# Patient Record
Sex: FEMALE | Race: WHITE | Employment: FULL TIME | URBAN - METROPOLITAN AREA
[De-identification: names, ages, dates, MRNs, and addresses within clinical notes are randomized per-mention and may not be internally consistent; named-entity substitution may affect disease eponyms.]

---

## 2018-01-18 ENCOUNTER — OFFICE VISIT (OUTPATIENT)
Dept: FAMILY MEDICINE CLINIC | Age: 63
End: 2018-01-18
Payer: COMMERCIAL

## 2018-01-18 VITALS
WEIGHT: 193.4 LBS | HEART RATE: 77 BPM | HEIGHT: 65 IN | BODY MASS INDEX: 32.22 KG/M2 | SYSTOLIC BLOOD PRESSURE: 125 MMHG | TEMPERATURE: 97.3 F | DIASTOLIC BLOOD PRESSURE: 72 MMHG | OXYGEN SATURATION: 98 %

## 2018-01-18 DIAGNOSIS — Z76.89 ESTABLISHING CARE WITH NEW DOCTOR, ENCOUNTER FOR: Primary | ICD-10-CM

## 2018-01-18 DIAGNOSIS — E03.9 HYPOTHYROIDISM, UNSPECIFIED TYPE: ICD-10-CM

## 2018-01-18 PROCEDURE — 99213 OFFICE O/P EST LOW 20 MIN: CPT | Performed by: NURSE PRACTITIONER

## 2018-01-18 RX ORDER — INSULIN GLARGINE 300 U/ML
INJECTION, SOLUTION SUBCUTANEOUS
COMMUNITY
Start: 2018-01-10

## 2018-01-18 RX ORDER — LOSARTAN POTASSIUM 50 MG/1
TABLET ORAL
COMMUNITY
Start: 2014-09-17

## 2018-01-18 RX ORDER — LEVOTHYROXINE SODIUM 112 UG/1
TABLET ORAL
COMMUNITY

## 2018-01-18 RX ORDER — PEN NEEDLE, DIABETIC 29 G X1/2"
NEEDLE, DISPOSABLE MISCELLANEOUS
Refills: 4 | COMMUNITY
Start: 2017-12-21 | End: 2018-09-20

## 2018-01-18 ASSESSMENT — ENCOUNTER SYMPTOMS
COUGH: 1
RHINORRHEA: 0
WHEEZING: 0
SINUS PRESSURE: 0
SINUS PAIN: 0
SHORTNESS OF BREATH: 0
ABDOMINAL PAIN: 0
NAUSEA: 0
VOMITING: 0
CHEST TIGHTNESS: 0

## 2018-01-18 ASSESSMENT — PATIENT HEALTH QUESTIONNAIRE - PHQ9
SUM OF ALL RESPONSES TO PHQ QUESTIONS 1-9: 0
SUM OF ALL RESPONSES TO PHQ9 QUESTIONS 1 & 2: 0
2. FEELING DOWN, DEPRESSED OR HOPELESS: 0
1. LITTLE INTEREST OR PLEASURE IN DOING THINGS: 0

## 2018-01-18 NOTE — PROGRESS NOTES
[facility] [place]  [address]  [place], [address]  [phone]      Valerie Marcano is a 63 y.o. female who presents today for her  medical conditions/complaints as noted below. Valerie Marcano is c/o of New Patient (est,ins change,sees endo for DM,declines flu vaccine,states labs were just done at endo,will call to get results,has appt with  next tue)  . HPI:   Pt here to est care. No concerns today. Insurance changed and needed new pcp. Had last labs done in dec 2017. She has new pt appt with dr Jonatan Nickerson next week, last a1c around 9. She has been diabetic for past 25 years. Stable on all meds. Past Medical History:   Diagnosis Date    Hypothyroidism     Kidney stones     Type II or unspecified type diabetes mellitus without mention of complication, not stated as uncontrolled       Past Surgical History:   Procedure Laterality Date    LITHOTRIPSY       Family History   Problem Relation Age of Onset    Heart Disease Mother     Colon Cancer Father     Diabetes Sister     Diabetes Brother      Social History   Substance Use Topics    Smoking status: Never Smoker    Smokeless tobacco: Never Used    Alcohol use No      Current Outpatient Prescriptions   Medication Sig Dispense Refill    levothyroxine (SYNTHROID) 112 MCG tablet       losartan (COZAAR) 50 MG tablet Take by mouth      TOUJEO SOLOSTAR 300 UNIT/ML injection pen       BD INSULIN SYRINGE ULTRAFINE 31G X 5/16\" 0.3 ML MISC USE 3 TIMES A DAY AS DIRECTED  4    aspirin 81 MG tablet Take 81 mg by mouth daily.  ONE TOUCH ULTRA TEST strip 1 each 3 times daily. 1    BD PEN NEEDLE SOLOMON U/F 32G X 4 MM MISC   1    Omega-3 Fatty Acids (FISH OIL) 1000 MG CAPS Take 3,000 mg by mouth 2 times daily.  insulin lispro (HUMALOG) 100 UNIT/ML injection vial Inject  into the skin 3 times daily (before meals).  Sliding scale      insulin lispro (HUMALOG) 100 UNIT/ML injection vial       tetracycline (ACHROMYCIN;SUMYCIN) 250 MG capsule Take 250 mg by mouth 3 times daily.  glyBURIDE-metformin (GLUCOVANCE) 5-500 MG per tablet Take 2 tablets by mouth 2 times daily (with meals).  insulin glargine (LANTUS SOLOSTAR) 100 UNIT/ML injection Inject 100 Units into the skin nightly.  levothyroxine (SYNTHROID) 112 MCG tablet Take 1 tablet by mouth daily. 0    losartan (COZAAR) 50 MG tablet Take 1 tablet by mouth daily. 1    simvastatin (ZOCOR) 20 MG tablet Take 1 tablet by mouth nightly. 0    LANTUS SOLOSTAR 100 UNIT/ML injection pen Inject 38 Units into the skin daily. 1    Vitamin D (CHOLECALCIFEROL) 1000 UNITS CAPS capsule Take 1,000 Units by mouth daily.  Omega-3 Fatty Acids (OMEGA-3 FISH OIL PO) Take  by mouth 2 times daily.  CINNAMON PO Take 1 tablet by mouth 2 times daily. No current facility-administered medications for this visit. No Known Allergies    Health Maintenance   Topic Date Due    Creatinine monitoring  1955    Hepatitis C screen  1955    HIV screen  11/18/1972    DTaP/Tdap/Td vaccine (1 - Tdap) 11/18/1976    Lipid screen  11/18/1997    Diabetes screen  11/18/1997    Potassium monitoring  10/15/2015    Zostavax vaccine  11/18/2017    Flu vaccine (1) 01/23/2019 (Originally 9/1/2017)    Breast cancer screen  09/12/2019    Cervical cancer screen  08/01/2020    Colon cancer screen colonoscopy  11/17/2027       Subjective:      Review of Systems   Constitutional: Negative for appetite change, chills, diaphoresis, fatigue and fever. HENT: Negative for postnasal drip, rhinorrhea, sinus pain and sinus pressure. Eyes: Negative for visual disturbance. Respiratory: Positive for cough. Negative for chest tightness, shortness of breath and wheezing. Cardiovascular: Negative for chest pain, palpitations and leg swelling. Gastrointestinal: Negative for abdominal pain, nausea and vomiting.    Endocrine: Negative for polydipsia, polyphagia and

## 2018-01-23 ENCOUNTER — TELEPHONE (OUTPATIENT)
Dept: PRIMARY CARE CLINIC | Age: 63
End: 2018-01-23

## 2018-01-23 NOTE — TELEPHONE ENCOUNTER
Patient needs a insurance referral sent in for Dr. David Aguillon. Patient has an appointment on Tuesday.

## 2018-02-13 LAB
CREATININE: 0.7 MG/DL
POTASSIUM (K+): 3.9

## 2018-07-23 ENCOUNTER — TELEPHONE (OUTPATIENT)
Dept: FAMILY MEDICINE CLINIC | Age: 63
End: 2018-07-23

## 2018-07-27 NOTE — TELEPHONE ENCOUNTER
Left a message for the patient to call back with the reasons for referral and the name of the nutritionist.

## 2018-07-27 NOTE — TELEPHONE ENCOUNTER
She sees the obgyn for her annual pap etc and the foot doctor for diabetic foot check  She is seeing dr Niels Bright for endo diabetic checks  Dr Niels Bright stated that she had to get referral from primary for nutrionist.

## 2018-07-30 NOTE — TELEPHONE ENCOUNTER
I can do her pap and diabetic foot exam at our office without referral, and again I need her to check on a covered provider for nutritionist since I do not know anyone in [place].

## 2018-08-07 ENCOUNTER — HOSPITAL ENCOUNTER (OUTPATIENT)
Age: 63
Setting detail: SPECIMEN
Discharge: HOME | End: 2018-08-07
Payer: COMMERCIAL

## 2018-08-07 LAB
THYROXINE, FREE: 1.4 NG/DL (ref 0.93–1.7)
TSH SERPL DL<=0.05 MIU/L-ACNC: 0.54 MIU/L (ref 0.3–5)

## 2018-09-20 ENCOUNTER — OFFICE VISIT (OUTPATIENT)
Dept: FAMILY MEDICINE CLINIC | Age: 63
End: 2018-09-20
Payer: COMMERCIAL

## 2018-09-20 ENCOUNTER — HOSPITAL ENCOUNTER (OUTPATIENT)
Age: 63
Setting detail: SPECIMEN
Discharge: HOME | End: 2018-09-20
Payer: COMMERCIAL

## 2018-09-20 VITALS
TEMPERATURE: 97.1 F | DIASTOLIC BLOOD PRESSURE: 75 MMHG | WEIGHT: 185 LBS | OXYGEN SATURATION: 98 % | BODY MASS INDEX: 30.82 KG/M2 | SYSTOLIC BLOOD PRESSURE: 138 MMHG | HEART RATE: 78 BPM | HEIGHT: 65 IN

## 2018-09-20 DIAGNOSIS — Z12.39 SCREENING FOR BREAST CANCER: ICD-10-CM

## 2018-09-20 DIAGNOSIS — Z11.59 ENCOUNTER FOR HEPATITIS C SCREENING TEST FOR LOW RISK PATIENT: ICD-10-CM

## 2018-09-20 DIAGNOSIS — Z23 NEED FOR TDAP VACCINATION: ICD-10-CM

## 2018-09-20 DIAGNOSIS — Z12.4 CERVICAL CANCER SCREENING: Primary | ICD-10-CM

## 2018-09-20 DIAGNOSIS — Z23 NEED FOR PROPHYLACTIC VACCINATION AND INOCULATION AGAINST VARICELLA: ICD-10-CM

## 2018-09-20 DIAGNOSIS — E78.2 MIXED HYPERLIPIDEMIA: ICD-10-CM

## 2018-09-20 PROCEDURE — 90471 IMMUNIZATION ADMIN: CPT | Performed by: NURSE PRACTITIONER

## 2018-09-20 PROCEDURE — 90715 TDAP VACCINE 7 YRS/> IM: CPT | Performed by: NURSE PRACTITIONER

## 2018-09-20 PROCEDURE — 99396 PREV VISIT EST AGE 40-64: CPT | Performed by: NURSE PRACTITIONER

## 2018-09-20 NOTE — PROGRESS NOTES
[address]  [address]  [phone]      Earnest Luong is a 63 y.o. female who presents today for her  medical conditions/complaints as noted below. Earnest Luong is c/o of Gynecologic Exam  .    HPI:     HPI  Pt here for routine pap. Has always had normal paps and same sexual partner ( ). She had 6 child vaginally. She is menopausal. She is taking calcium and vit d supplement. Past Medical History:   Diagnosis Date    Hypothyroidism     Hypothyroidism 1/18/2018    Kidney stones     Mixed hyperlipidemia 9/20/2018    Type II or unspecified type diabetes mellitus without mention of complication, not stated as uncontrolled       Past Surgical History:   Procedure Laterality Date    LITHOTRIPSY       Family History   Problem Relation Age of Onset    Heart Disease Mother     Colon Cancer Father     Diabetes Sister     Diabetes Brother      Social History   Substance Use Topics    Smoking status: Never Smoker    Smokeless tobacco: Never Used    Alcohol use No      Current Outpatient Prescriptions   Medication Sig Dispense Refill    zoster recombinant adjuvanted vaccine (SHINGRIX) 50 MCG SUSR injection Inject 0.5 mLs into the muscle once for 1 dose 50 MCG IM then repeat 2-6 months. 1 each 1    NOVOLOG 100 UNIT/ML injection vial 12 UNITS IN THE MORNING, 12 UNITS AT LUNCH, AND 20 UNITS AT SUPPER  3    insulin lispro (HUMALOG) 100 UNIT/ML injection vial       levothyroxine (SYNTHROID) 112 MCG tablet       losartan (COZAAR) 50 MG tablet Take by mouth      TOUJEO SOLOSTAR 300 UNIT/ML injection pen       glyBURIDE-metformin (GLUCOVANCE) 5-500 MG per tablet Take 2 tablets by mouth 2 times daily (with meals).  insulin glargine (LANTUS SOLOSTAR) 100 UNIT/ML injection Inject 100 Units into the skin nightly.  simvastatin (ZOCOR) 20 MG tablet Take 1 tablet by mouth nightly. 0    ONE TOUCH ULTRA TEST strip 1 each 3 times daily.   1 non-tender without mass, no thyromegaly or thyroid nodules, no cervical lymphadenopathy  Pulmonary/Chest: clear to auscultation bilaterally- no wheezes, rales or rhonchi, normal air movement, no respiratory distress  Cardiovascular: normal rate, regular rhythm, normal S1 and S2, no murmurs, rubs, clicks, or gallops  Abdomen: soft, non-tender, non-distended, normal bowel sounds, no masses or organomegaly  Pelvic: normal external genitalia, vulva, vagina, cervix, uterus and adnexa. No CMT. No noted discharge or lesions. No masses noted. Breast: appear normal, no suspicious masses, no skin or nipple changes or axillary nodes bilaterally. Psych: pleasant, cooperative      Diabetic foot check: Normal strength and range of motion of toes, feet, and ankles bilaterally. No joint deformity. No cyanosis or clubbing. 100% sensation at all 22 test points with the 10 gram filament. Dorsalis pedis pulses intact bilaterally. Capillary refill at the toes was less than 2 seconds. Light touch sensation intact bilaterally. Hair growth present on feet and toes bilaterally. No skin breakdown, erythema, rub spots, blisters, scaling, or ulcers. No calluses or corns. Toenails thin and not ingrown. No evidence of fungal infection. Assessment:    annual pap exam   Diagnosis Orders   1. Cervical cancer screening  PAP Smear   2. Need for prophylactic vaccination and inoculation against varicella  zoster recombinant adjuvanted vaccine (SHINGRIX) 50 MCG SUSR injection   3. Need for Tdap vaccination  Tdap (age 6y and older) IM ([address])   4. Uncontrolled type 2 diabetes mellitus without complication, with long-term current use of insulin (HCC)   DIABETES FOOT EXAM    Vitamin D 25 Hydroxy    Vitamin H56    Basic Metabolic Panel    CBC   5. Mixed hyperlipidemia  Lipid Panel   6. Encounter for hepatitis C screening test for low risk patient  Hepatitis C Antibody   7.  Screening for breast cancer  ISHAN DIGITAL SCREEN W CAD BILATERAL dose 50 MCG IM then repeat 2-6 months. Dispense:  1 each     Refill:  1   Health Maintenance reviewed - mammogram ordered, patient to schedule appointment, tetanus booster given, patient asked to schedule diabetic eye exam, hard rx for shingles vaccine. Continue same meds, and calcium/vit d supplement  Regular exercise  Will get records from dr Grant Grover for DM  Update labs  Will call with test results  Pt does not want to get tdap and pneumo on same day, she will RTO for pneumo 23    Patient given educational materials - see patient instructions. Discussed use, benefit, and side effects of prescribed medications. All patient questions answered. Pt voiced understanding. Reviewed health maintenance. Instructed to continue current medications, diet and exercise. Patient agreed with treatment plan. Follow up as directed below.      Electronically signed by SALOME Forman on 9/20/2018 at 9:50 AM

## 2018-09-20 NOTE — PROGRESS NOTES
Visit Information    Have you changed or started any medications since your last visit including any over-the-counter medicines, vitamins, or herbal medicines? no   Have you stopped taking any of your medications? Is so, why? -  no  Are you having any side effects from any of your medications? - no    Have you seen any other physician or provider since your last visit?  no   Have you had any other diagnostic tests since your last visit?  no   Have you been seen in the emergency room and/or had an admission in a hospital since we last saw you?  no   Have you had your routine dental cleaning in the past 6 months?  no     Do you have an active MyChart account? If no, what is the barrier? No: na    Patient Care Team:  TATA Dutton CNP as PCP - General (Nurse Practitioner)    Medical History Review  Past Medical, Family, and Social History reviewed and  contribute to the patient presenting condition    Health Maintenance   Topic Date Due    Creatinine monitoring  1955    Hepatitis C screen  1955    Diabetic foot exam  11/18/1967    A1C test (Diabetic or Prediabetic)  11/18/1967    Diabetic retinal exam  11/18/1967    Lipid screen  11/18/1967    HIV screen  11/18/1972    Diabetic microalbuminuria test  11/18/1975    DTaP/Tdap/Td vaccine (1 - Tdap) 11/18/1976    Pneumococcal med risk (1 of 1 - PPSV23) 11/18/1976    Shingles Vaccine (1 of 2 - 2 Dose Series) 11/18/2007    Potassium monitoring  10/15/2015    Flu vaccine (1) 01/23/2019 (Originally 9/1/2018)    TSH testing  08/07/2019    Breast cancer screen  09/12/2019    Cervical cancer screen  08/01/2020    Colon cancer screen colonoscopy  11/17/2027       After obtaining consent, and per orders of Juan Koch CNP, injection of Tdap given in Right deltoid by Filemon Craft. Patient instructed to remain in clinic for 20 minutes afterwards, and to report any adverse reaction to me immediately.

## 2018-09-24 LAB
HPV SAMPLE: NORMAL
HPV SOURCE: NORMAL
HPV, GENOTYPE 16: NOT DETECTED
HPV, GENOTYPE 18: NOT DETECTED
HPV, HIGH RISK OTHER: NOT DETECTED
HPV, INTERPRETATION: NORMAL

## 2018-09-27 ENCOUNTER — HOSPITAL ENCOUNTER (OUTPATIENT)
Age: 63
Setting detail: SPECIMEN
Discharge: HOME | End: 2018-09-27
Payer: COMMERCIAL

## 2018-09-27 DIAGNOSIS — Z11.59 ENCOUNTER FOR HEPATITIS C SCREENING TEST FOR LOW RISK PATIENT: ICD-10-CM

## 2018-09-27 DIAGNOSIS — E78.2 MIXED HYPERLIPIDEMIA: ICD-10-CM

## 2018-09-27 LAB
ANION GAP SERPL CALCULATED.3IONS-SCNC: 16 MMOL/L (ref 9–17)
BUN BLDV-MCNC: 13 MG/DL (ref 8–23)
BUN/CREAT BLD: ABNORMAL (ref 9–20)
CALCIUM SERPL-MCNC: 9.1 MG/DL (ref 8.6–10.4)
CHLORIDE BLD-SCNC: 99 MMOL/L (ref 98–107)
CHOLESTEROL/HDL RATIO: 3.4
CHOLESTEROL: 188 MG/DL
CO2: 26 MMOL/L (ref 20–31)
CREAT SERPL-MCNC: 0.64 MG/DL (ref 0.5–0.9)
CREATININE URINE: 103.5 MG/DL (ref 28–217)
GFR AFRICAN AMERICAN: >60 ML/MIN
GFR NON-AFRICAN AMERICAN: >60 ML/MIN
GFR SERPL CREATININE-BSD FRML MDRD: ABNORMAL ML/MIN/{1.73_M2}
GFR SERPL CREATININE-BSD FRML MDRD: ABNORMAL ML/MIN/{1.73_M2}
GLUCOSE BLD-MCNC: 157 MG/DL (ref 70–99)
HCT VFR BLD CALC: 43.8 % (ref 36.3–47.1)
HDLC SERPL-MCNC: 56 MG/DL
HEMOGLOBIN: 14.5 G/DL (ref 11.9–15.1)
HEPATITIS C ANTIBODY: NONREACTIVE
LDL CHOLESTEROL: 110 MG/DL (ref 0–130)
MCH RBC QN AUTO: 29.5 PG (ref 25.2–33.5)
MCHC RBC AUTO-ENTMCNC: 33.1 G/DL (ref 28.4–34.8)
MCV RBC AUTO: 89 FL (ref 82.6–102.9)
MICROALBUMIN/CREAT 24H UR: <12 MG/L
MICROALBUMIN/CREAT UR-RTO: NORMAL MCG/MG CREAT
NRBC AUTOMATED: 0 PER 100 WBC
PDW BLD-RTO: 13 % (ref 11.8–14.4)
PLATELET # BLD: 235 K/UL (ref 138–453)
PMV BLD AUTO: 10.1 FL (ref 8.1–13.5)
POTASSIUM SERPL-SCNC: 4.2 MMOL/L (ref 3.7–5.3)
RBC # BLD: 4.92 M/UL (ref 3.95–5.11)
SODIUM BLD-SCNC: 141 MMOL/L (ref 135–144)
TRIGL SERPL-MCNC: 108 MG/DL
VITAMIN B-12: 694 PG/ML (ref 232–1245)
VITAMIN D 25-HYDROXY: 49.5 NG/ML (ref 30–100)
VLDLC SERPL CALC-MCNC: NORMAL MG/DL (ref 1–30)
WBC # BLD: 6.7 K/UL (ref 3.5–11.3)

## 2018-10-05 LAB — CYTOLOGY REPORT: NORMAL

## 2019-02-26 ENCOUNTER — TELEPHONE (OUTPATIENT)
Dept: FAMILY MEDICINE CLINIC | Age: 64
End: 2019-02-26

## 2019-12-30 ENCOUNTER — TELEPHONE (OUTPATIENT)
Dept: FAMILY MEDICINE CLINIC | Age: 64
End: 2019-12-30

## 2020-10-07 ENCOUNTER — TELEPHONE (OUTPATIENT)
Dept: FAMILY MEDICINE CLINIC | Age: 65
End: 2020-10-07